# Patient Record
Sex: FEMALE | Race: WHITE | Employment: OTHER | URBAN - METROPOLITAN AREA
[De-identification: names, ages, dates, MRNs, and addresses within clinical notes are randomized per-mention and may not be internally consistent; named-entity substitution may affect disease eponyms.]

---

## 2017-11-06 ENCOUNTER — HOSPITAL ENCOUNTER (OUTPATIENT)
Dept: MAMMOGRAPHY | Age: 67
Discharge: HOME | End: 2017-11-06
Payer: MEDICARE

## 2017-11-06 ENCOUNTER — HOSPITAL ENCOUNTER (OUTPATIENT)
Dept: ULTRASOUND IMAGING | Age: 67
End: 2017-11-06
Payer: MEDICARE

## 2017-11-06 DIAGNOSIS — R92.8 ABNORMAL MAMMOGRAM: ICD-10-CM

## 2017-11-06 PROCEDURE — G0279 TOMOSYNTHESIS, MAMMO: HCPCS

## 2018-11-14 ENCOUNTER — HOSPITAL ENCOUNTER (OUTPATIENT)
Dept: MAMMOGRAPHY | Age: 68
Discharge: HOME | End: 2018-11-16
Payer: MEDICARE

## 2018-11-14 DIAGNOSIS — Z12.39 BREAST CANCER SCREENING: ICD-10-CM

## 2018-11-14 PROCEDURE — 77067 SCR MAMMO BI INCL CAD: CPT

## 2019-11-19 ENCOUNTER — HOSPITAL ENCOUNTER (OUTPATIENT)
Dept: MAMMOGRAPHY | Age: 69
Discharge: HOME | End: 2019-11-21
Payer: MEDICARE

## 2019-11-19 DIAGNOSIS — M85.80 OSTEOPENIA, UNSPECIFIED LOCATION: ICD-10-CM

## 2019-11-19 DIAGNOSIS — Z12.39 SCREENING BREAST EXAMINATION: ICD-10-CM

## 2019-11-19 PROCEDURE — 77080 DXA BONE DENSITY AXIAL: CPT

## 2019-11-19 PROCEDURE — 77063 BREAST TOMOSYNTHESIS BI: CPT

## 2021-04-12 ENCOUNTER — APPOINTMENT (OUTPATIENT)
Dept: GENERAL RADIOLOGY | Age: 71
End: 2021-04-12
Payer: MEDICARE

## 2021-04-12 ENCOUNTER — APPOINTMENT (OUTPATIENT)
Dept: CT IMAGING | Age: 71
End: 2021-04-12
Payer: MEDICARE

## 2021-04-12 ENCOUNTER — HOSPITAL ENCOUNTER (EMERGENCY)
Age: 71
Discharge: HOME | End: 2021-04-12
Attending: EMERGENCY MEDICINE
Payer: MEDICARE

## 2021-04-12 VITALS
OXYGEN SATURATION: 98 % | RESPIRATION RATE: 16 BRPM | SYSTOLIC BLOOD PRESSURE: 118 MMHG | WEIGHT: 198 LBS | BODY MASS INDEX: 35.08 KG/M2 | TEMPERATURE: 97.2 F | DIASTOLIC BLOOD PRESSURE: 61 MMHG | HEART RATE: 77 BPM | HEIGHT: 63 IN

## 2021-04-12 DIAGNOSIS — I44.0 FIRST DEGREE AV BLOCK: ICD-10-CM

## 2021-04-12 DIAGNOSIS — M54.2 NECK PAIN: Primary | ICD-10-CM
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
## 2021-04-12 LAB
ABSOLUTE EOS #: 0.09 K/UL (ref 0–0.44)
ABSOLUTE IMMATURE GRANULOCYTE: 0.02 K/UL (ref 0–0.3)
ABSOLUTE LYMPH #: 1.09 K/UL (ref 1.1–3.7)
ABSOLUTE MONO #: 0.43 K/UL (ref 0.1–1.2)
ANION GAP SERPL CALCULATED.3IONS-SCNC: 11 MMOL/L (ref 9–17)
BASOPHILS # BLD: 1 % (ref 0–2)
BASOPHILS ABSOLUTE: 0.03 K/UL (ref 0–0.2)
BUN BLDV-MCNC: 23 MG/DL (ref 8–23)
BUN/CREAT BLD: 13 (ref 9–20)
CALCIUM SERPL-MCNC: 9.8 MG/DL (ref 8.6–10.4)
CHLORIDE BLD-SCNC: 99 MMOL/L (ref 98–107)
CO2: 20 MMOL/L (ref 20–31)
CREAT SERPL-MCNC: 1.81 MG/DL (ref 0.5–0.9)
DIFFERENTIAL TYPE: ABNORMAL
EOSINOPHILS RELATIVE PERCENT: 2 % (ref 1–4)
GFR AFRICAN AMERICAN: 34 ML/MIN
GFR NON-AFRICAN AMERICAN: 28 ML/MIN
GFR SERPL CREATININE-BSD FRML MDRD: ABNORMAL ML/MIN/{1.73_M2}
GFR SERPL CREATININE-BSD FRML MDRD: ABNORMAL ML/MIN/{1.73_M2}
GLUCOSE BLD-MCNC: 192 MG/DL (ref 70–99)
HCT VFR BLD CALC: 37.2 % (ref 36.3–47.1)
HEMOGLOBIN: 12.3 G/DL (ref 11.9–15.1)
IMMATURE GRANULOCYTES: 0 %
LYMPHOCYTES # BLD: 18 % (ref 24–43)
MAGNESIUM: 1.9 MG/DL (ref 1.6–2.6)
MCH RBC QN AUTO: 30.6 PG (ref 25.2–33.5)
MCHC RBC AUTO-ENTMCNC: 33.1 G/DL (ref 28.4–34.8)
MCV RBC AUTO: 92.5 FL (ref 82.6–102.9)
MONOCYTES # BLD: 7 % (ref 3–12)
MYOGLOBIN: 237 NG/ML (ref 25–58)
NRBC AUTOMATED: 0 PER 100 WBC
PDW BLD-RTO: 12.6 % (ref 11.8–14.4)
PLATELET # BLD: 115 K/UL (ref 138–453)
PLATELET ESTIMATE: ABNORMAL
PMV BLD AUTO: 9.7 FL (ref 8.1–13.5)
POTASSIUM SERPL-SCNC: 4.8 MMOL/L (ref 3.7–5.3)
RBC # BLD: 4.02 M/UL (ref 3.95–5.11)
RBC # BLD: ABNORMAL 10*6/UL
SEG NEUTROPHILS: 72 % (ref 36–65)
SEGMENTED NEUTROPHILS ABSOLUTE COUNT: 4.42 K/UL (ref 1.5–8.1)
SODIUM BLD-SCNC: 130 MMOL/L (ref 135–144)
TROPONIN INTERP: ABNORMAL
TROPONIN T: ABNORMAL NG/ML
TROPONIN, HIGH SENSITIVITY: 20 NG/L (ref 0–14)
TSH SERPL DL<=0.05 MIU/L-ACNC: 1.62 MIU/L (ref 0.3–5)
WBC # BLD: 6.1 K/UL (ref 3.5–11.3)
WBC # BLD: ABNORMAL 10*3/UL

## 2021-04-12 PROCEDURE — 84443 ASSAY THYROID STIM HORMONE: CPT

## 2021-04-12 PROCEDURE — 85025 COMPLETE CBC W/AUTO DIFF WBC: CPT

## 2021-04-12 PROCEDURE — 81003 URINALYSIS AUTO W/O SCOPE: CPT

## 2021-04-12 PROCEDURE — 71045 X-RAY EXAM CHEST 1 VIEW: CPT

## 2021-04-12 PROCEDURE — 83874 ASSAY OF MYOGLOBIN: CPT

## 2021-04-12 PROCEDURE — 93005 ELECTROCARDIOGRAM TRACING: CPT | Performed by: EMERGENCY MEDICINE

## 2021-04-12 PROCEDURE — 70450 CT HEAD/BRAIN W/O DYE: CPT

## 2021-04-12 PROCEDURE — 83735 ASSAY OF MAGNESIUM: CPT

## 2021-04-12 PROCEDURE — 72125 CT NECK SPINE W/O DYE: CPT

## 2021-04-12 PROCEDURE — 84484 ASSAY OF TROPONIN QUANT: CPT

## 2021-04-12 PROCEDURE — 99284 EMERGENCY DEPT VISIT MOD MDM: CPT

## 2021-04-12 PROCEDURE — 80048 BASIC METABOLIC PNL TOTAL CA: CPT

## 2021-04-12 RX ORDER — 0.9 % SODIUM CHLORIDE 0.9 %
1000 INTRAVENOUS SOLUTION INTRAVENOUS ONCE
Status: DISCONTINUED | OUTPATIENT
Start: 2021-04-12 | End: 2021-04-12 | Stop reason: HOSPADM

## 2021-04-12 ASSESSMENT — ENCOUNTER SYMPTOMS
VOMITING: 0
TROUBLE SWALLOWING: 0
ABDOMINAL PAIN: 0
SHORTNESS OF BREATH: 0

## 2021-04-12 ASSESSMENT — PAIN DESCRIPTION - ORIENTATION: ORIENTATION: LEFT

## 2021-04-12 NOTE — ED PROVIDER NOTES
Medical Decision Making: The patient was initially seen by Dr. Jennifer Kwan and signed out to me after discussing the case with him thoroughly. CT scan findings are discussed with the patient and she is already had the thyroid addressed and had a negative biopsy. There is no acute findings in the head of the neck and she is able to be discharged home. Treatment diagnosis and follow-up were discussed thoroughly. CONSULTS:  None    PROCEDURES:  None    FINAL IMPRESSION      1. Neck pain    2. Fall, initial encounter    3.  First degree AV block         DISPOSITION/PLAN   DISPOSITION Decision To Discharge 04/12/2021 07:20:00 PM       PATIENT REFERRED TO:   Radha Linares MD  [address] [phone]      As needed    [facility] ED  [address] [facility]  [phone]    If symptoms worsen      DISCHARGE MEDICATIONS:     New Prescriptions    No medications on file         (Please note that portions of this note were completed with a voice recognition program.  Efforts were made to edit the dictations but occasionally words are mis-transcribed.)    Rupert Good MD  Attending Emergency Physician         Rupert Good MD  04/12/21 [id number]

## 2021-04-13 LAB
EKG ATRIAL RATE: 74 BPM
EKG P AXIS: 48 DEGREES
EKG P-R INTERVAL: 332 MS
EKG Q-T INTERVAL: 460 MS
EKG QRS DURATION: 146 MS
EKG QTC CALCULATION (BAZETT): 510 MS
EKG R AXIS: 65 DEGREES
EKG T AXIS: 24 DEGREES
EKG VENTRICULAR RATE: 74 BPM

## 2022-03-18 ENCOUNTER — HOSPITAL ENCOUNTER (OUTPATIENT)
Dept: ULTRASOUND IMAGING | Age: 72
Discharge: HOME | End: 2022-03-20
Payer: MEDICARE

## 2022-03-18 ENCOUNTER — HOSPITAL ENCOUNTER (OUTPATIENT)
Dept: MAMMOGRAPHY | Age: 72
Discharge: HOME | End: 2022-03-20
Payer: MEDICARE

## 2022-03-18 DIAGNOSIS — N64.4 BREAST PAIN, RIGHT: ICD-10-CM

## 2022-03-18 PROCEDURE — G0279 TOMOSYNTHESIS, MAMMO: HCPCS

## 2022-03-18 PROCEDURE — 76642 ULTRASOUND BREAST LIMITED: CPT

## 2022-03-29 ENCOUNTER — HOSPITAL ENCOUNTER (OUTPATIENT)
Dept: MAMMOGRAPHY | Age: 72
Discharge: HOME | End: 2022-03-31
Payer: MEDICARE

## 2022-03-29 VITALS — SYSTOLIC BLOOD PRESSURE: 105 MMHG | HEART RATE: 96 BPM | DIASTOLIC BLOOD PRESSURE: 62 MMHG

## 2022-03-29 DIAGNOSIS — Z98.890 STATUS POST BREAST BIOPSY: ICD-10-CM

## 2022-03-29 DIAGNOSIS — R92.1 BREAST CALCIFICATION, RIGHT: ICD-10-CM

## 2022-03-29 PROCEDURE — 88305 TISSUE EXAM BY PATHOLOGIST: CPT

## 2022-03-29 PROCEDURE — 77065 DX MAMMO INCL CAD UNI: CPT

## 2022-03-29 PROCEDURE — 2500000003 HC RX 250 WO HCPCS

## 2022-03-29 PROCEDURE — 19081 BX BREAST 1ST LESION STRTCTC: CPT

## 2022-03-31 LAB — SURGICAL PATHOLOGY REPORT: NORMAL

## 2022-04-06 ENCOUNTER — TELEPHONE (OUTPATIENT)
Dept: MAMMOGRAPHY | Age: 72
End: 2022-04-06

## 2022-04-06 NOTE — TELEPHONE ENCOUNTER
Spoke to Susan regarding her normal breast biopsy results. Radiology is recommending [facility], Katherine voiced understanding.

## 2022-05-12 ENCOUNTER — HOSPITAL ENCOUNTER (OUTPATIENT)
Dept: MRI IMAGING | Age: 72
Discharge: HOME | End: 2022-05-14
Payer: MEDICARE

## 2022-05-12 DIAGNOSIS — N64.4 BREAST PAIN, RIGHT: ICD-10-CM

## 2022-05-12 DIAGNOSIS — R92.1 BREAST CALCIFICATION SEEN ON MAMMOGRAM: ICD-10-CM

## 2022-05-12 DIAGNOSIS — D24.1 FIBROADENOMA OF RIGHT BREAST: ICD-10-CM

## 2022-05-12 DIAGNOSIS — Z80.3 FAMILY HISTORY OF BREAST CANCER: ICD-10-CM

## 2022-05-12 LAB
CREAT SERPL-MCNC: 1.55 MG/DL (ref 0.5–0.9)
GFR AFRICAN AMERICAN: 40 ML/MIN
GFR NON-AFRICAN AMERICAN: 33 ML/MIN
GFR SERPL CREATININE-BSD FRML MDRD: ABNORMAL ML/MIN/{1.73_M2}

## 2022-05-12 PROCEDURE — 36415 COLL VENOUS BLD VENIPUNCTURE: CPT

## 2022-05-12 PROCEDURE — 82565 ASSAY OF CREATININE: CPT

## 2022-05-12 RX ORDER — SODIUM CHLORIDE 0.9 % (FLUSH) 0.9 %
20 SYRINGE (ML) INJECTION PRN
Status: DISCONTINUED | OUTPATIENT
Start: 2022-05-12 | End: 2022-05-15 | Stop reason: HOSPADM

## 2022-09-15 ENCOUNTER — TRANSCRIBE ORDERS (OUTPATIENT)
Dept: ADMINISTRATIVE | Age: 72
End: 2022-09-15

## 2022-09-15 DIAGNOSIS — Z78.0 POST-MENOPAUSAL: ICD-10-CM

## 2022-09-15 DIAGNOSIS — M85.80 OSTEOPENIA, UNSPECIFIED LOCATION: Primary | ICD-10-CM

## 2022-12-16 ENCOUNTER — HOSPITAL ENCOUNTER (OUTPATIENT)
Age: 72
Discharge: HOME | End: 2022-12-16
Payer: MEDICARE

## 2022-12-16 LAB
ALT SERPL-CCNC: 16 U/L (ref 5–33)
CHOLESTEROL/HDL RATIO: 2.6
CHOLESTEROL: 154 MG/DL
HDLC SERPL-MCNC: 60 MG/DL
LDL CHOLESTEROL: 73 MG/DL (ref 0–130)
TRIGL SERPL-MCNC: 107 MG/DL

## 2022-12-16 PROCEDURE — 80061 LIPID PANEL: CPT

## 2022-12-16 PROCEDURE — 84460 ALANINE AMINO (ALT) (SGPT): CPT

## 2022-12-16 PROCEDURE — 36415 COLL VENOUS BLD VENIPUNCTURE: CPT

## 2023-01-13 ENCOUNTER — HOSPITAL ENCOUNTER (OUTPATIENT)
Dept: DIABETES SERVICES | Age: 73
Setting detail: THERAPIES SERIES
Discharge: HOME | End: 2023-01-13
Payer: MEDICARE

## 2023-01-13 PROCEDURE — G0108 DIAB MANAGE TRN  PER INDIV: HCPCS

## 2023-01-13 RX ORDER — ASPIRIN 325 MG
650 TABLET ORAL PRN
COMMUNITY
End: 2023-01-13

## 2023-01-13 RX ORDER — ATORVASTATIN CALCIUM 40 MG/1
40 TABLET, FILM COATED ORAL DAILY
COMMUNITY

## 2023-01-13 RX ORDER — FLECAINIDE ACETATE 150 MG/1
150 TABLET ORAL 2 TIMES DAILY
COMMUNITY

## 2023-01-13 RX ORDER — ASPIRIN 81 MG/1
81 TABLET, CHEWABLE ORAL DAILY
COMMUNITY

## 2023-01-13 RX ORDER — METOPROLOL SUCCINATE 25 MG/1
25 TABLET, EXTENDED RELEASE ORAL DAILY
COMMUNITY

## 2023-01-13 RX ORDER — SEMAGLUTIDE 1.34 MG/ML
2 INJECTION, SOLUTION SUBCUTANEOUS
COMMUNITY

## 2023-01-13 SDOH — ECONOMIC STABILITY: FOOD INSECURITY: ADDITIONAL INFORMATION: NO

## 2023-01-13 ASSESSMENT — PROBLEM AREAS IN DIABETES QUESTIONNAIRE (PAID)
PAID-5 TOTAL SCORE: 13
COPING WITH COMPLICATIONS OF DIABETES: 3
FEELING DEPRESSED WHEN YOU THINK ABOUT LIVING WITH DIABETES: 2
FEELING SCARED WHEN YOU THINK ABOUT LIVING WITH DIABETES: 2
FEELING THAT DIABETES IS TAKING UP TOO MUCH OF YOUR MENTAL AND PHYSICAL ENERGY EVERY DAY: 3
WORRYING ABOUT THE FUTURE AND THE POSSIBILITY OF SERIOUS COMPLICATIONS: 3

## 2023-01-13 NOTE — LETTER
[facility]  [address]  [facility] [address]  Phone: [phone]         January 13, 2023    To :Janelle Barthel, MD    From: Darlin Hickman RN    Patient: Troy Stone   YOB: 1950   Date of Visit: 1/13/23     An initial assessment to determine diabetes education needs was completed. Provided basic education on healthy eating patterns and how her diabetes medications work and prevention of low and high blood sugar    Education plan includes :interpretation of lab results, blood sugar goals, complications of diabetes mellitus, hypoglycemia prevention and treatment, exercise, illness management and nutrition. An ongoing plan was created to include: follow up - will call to set up - wanted to see cost of session before scheduling follow up    Thank you for the opportunity to provide Diabetes Self Management Education to your patient.

## 2023-01-13 NOTE — PROGRESS NOTES
Diabetes Self- Management Education Program Assessment -   Also see Diabetic Screening  Patient, Akila Lora,  here for diabetes self-management education  visit/ assessment. Today's visit was in an individual setting. MEDICAL HISTORY:  Past Medical History:   Diagnosis Date    Diabetes mellitus ([address]     Hypertension      No family history on file. Patient has no known allergies. Immunization History   Administered Date(s) Administered    COVID-19, MODERNA BLUE border, Primary or Immunocompromised, (age 12y+), IM, 100 mcg/0.5mL 02/12/2021, 03/12/2021     Current Medications  No current outpatient medications on file. No current facility-administered medications for this encounter.   :     Comments:  Allergies:  No Known Allergies      A1C blood level - at goal < 7%   Lab Results   Component Value Date    LABA1C 7.4 (H) 08/12/2016     Lab Results   Component Value Date    CREATININE 1.55 (H) 05/12/2022       Blood pressure ( 130/ 80)  Or less  BP Readings from Last 3 Encounters:   03/29/22 105/62   04/12/21 118/61        Cholesterol ( LDL under  100)   Lab Results   Component Value Date    LDLCHOLESTEROL 73 12/16/2022    LDLDIRECT 199 (H) 06/10/2016       Diabetes Self- Management Education Record    Participant Name: Akila Lora  Referring Provider: Madi Hernandez MD   Assessment/Evaluation Ratings:  1=Needs Instruction   4=Demonstrates Understanding/Competency  2=Needs Review   NC=Not Covered    3=Comprehends Key Points  N/A=Not Applicable  Topics/Learning Objectives Pre-session Assess Date:  1/13/23 rs   Instr. Date Reinforce Date Post- session Eval Comments   Diabetes disease process & Treatment process: Define diabetes & pre-diabetes; Identify own type of diabetes; role of the pancreas; signs/symptoms; diagnostic criteria; prevention & treatment options; contributing factors.  1    1/13/23 rs - stated DX about 5 years ago - but had gaps with health care - initial dx a1c 14% - now follows with pcp and dr Miriam Geronimo - since that time keept A1c 6- 7% but last  up to 8.4 and admit diet not good - seeking help now for diet planning and understanding meds and bg changes   Incorporating nutritional management into lifestyle: Describe effect of type, amount & timing of food on blood glucose; Describe basic meal planning techniques & current nutrition guideline   1  Retired now and sleeps in - late or skipped BK    Maybe Glucerna    Maybe meal at 2 pm and dinner is just snacking all night     Lives with her sister but they do not do meals together - out to eat few times per week     Drinks mostly diet mt dew    What to eat - Food groups, When to eat - timing of meals and snacks, and How much to eat - portions control. 1/13/23 rs - given glucerna coupons and example 1500 ADA diet        calories/ day   CHO choices/ meal   CHO choices/  day   grams of protein /day   gram of fat /day     Correctly read food labels & demonstrate CHO counting & portion control with personalized meal plan. Identify dining out strategies, & dietary sick day guidelines. 1       Incorporating physical activity into lifestyle:   Verbalize effect of exercise on blood glucose levels; benefits of regular exercise; safety considerations; contraindications; maintenance of activity. 1    Not active baseline  Thinking of starting use of home exercise machine - in her room    Using medications safely:  Identify effects of diabetes medicines on blood glucose levels; List diabetes medication taken, action & side effects;    1       Insulin / Injectable - Appropriate injection sites; proper storage; supplies needed; proper technique; safe needle disposal guidelines.    1    Omzempic - 2 mg ( she thinks) one weekly  and basaglar 50 units once per day - novolog log if high BG   Monitoring blood glucose, interpreting and using results:  Identify recommended & personal blood glucose targets; importance of testing; testing supplies; HgbA1C target levels; Factors affecting blood glucose; Importance of logging blood glucose levels for pattern recognition; ketone testing; safe lancet disposal.   1    Home BG meter - 60- 90 - and has had lows that need EMS call 2 x in last year - but also higher BG in pm in snacking - stated over 200 after pop tarts   Prevention, detection & treatment of acute complications:  Identify symptoms of hyper & hypoglycemia, and prevention & treatment strategies. 1       Describe sick day guidelines & indications for  physician notification. Identify short term consequences of poor control. Disaster preparedness strategies    1       Prevention, detection & treatment of chronic complications:  Define the natural course of diabetes & describe the relationship of blood glucose levels to long term complications of diabetes. Identify preventative measures & standards of care. 1       Developing strategies to address psychosocial issues:  Describe feelings about living with diabetes; Describe how stress, depression & anxiety affect blood glucose; Identify coping strategies; Identify support needed & support network available. 1    Paid = 13   Developing strategies to promote health/change behavior: Identify 7 self-care behaviors; Personal health risk factors; Benefits, challenges & strategies for behavioral change;    1         Individualized goal selection. My goal , to help me improve my health, I will:   1 Healthy eating       2. Be more active         Plan  Follow-up Appointments planned will call to set up - concern is cost of co pays    Instruction Method: [x]Lecture/Discussion  []Power Point Presentation  [x]Handouts  []Return Demonstration    Education Materials/Equipment Provided (VIA Mail for phone visits)  :    [x]Self-Management - Initial assessment - Enrolment in to ADA  Where do I Begin, Living with Type 2 diabetes ADA home support program and  handout on diabetes education classes.      []Understanding Diabetes session       Book How to Thrive: A guide for Your Journey with Diabetes ADA booklet -pages 4, 14-19, 22-23        View: Understanding Type 2 Diabetes from Animated Diabetes Patient [email or web address]    [] Healthy Eating and Meal planning  How to Thrive: A guide for Your journey with Diabetes - ADA booklet - pages 20- 21 & 42-43  Handouts: Smarter snacking, Lowdown on low - carb diets, Supermarket savvy, Ready, set, start counting, Reading a nutrition fact label, 7 ways to size up your servings    []   Medications and Prevention of Complications      Book How to Thrive: A guide for Your Journey with Diabetes - ADA booklet -  pages 6-7, 12-13, 24-27 & 28 -35  Handouts: Know your numbers, Vaccinations, Type 2 diabetes and the role of GLP- 1, How to care for your teeth and gums, Caring for your feet, How to pick the right shoes  Individualized Diabetes report card     []  Diet Follow Up- CHO counting and  planning for sick days, holiday, vacations   How to Thrive: A guide for Your journey with Diabetes - ADA booklet  - pages 43- 37  Diabetes Food hub www. diabetesfoodhub.org   Handouts: Sick day rules, Dining out guide, Diabetes and alcohol, Traveling with diabetes, Diabetes disaster preparedness plan, Holidays and special occasions                                                            []  Self care and goal review -  3 month follow -    Handouts: AADE7 Self care behaviors work sheets and personal goal setting worksheet review, DSME support options on line     []Self-Management  Gestational - RN class -Resource materials sent out : care booklet - \" Gestational Diabetes Mellitus ( GDM) toolkit form [place] gestational diabetes postpartum care learning collaborative 2019. \"Simple Guidelines for meal planning with gestational diabetes. SMBG sheets to fax back to MFM weekly. BD  healthy injection site selection and rotation with 6 mm insulin syringe and 4 mm pen needle.  Gestational diabetes handout from [employer] 2016. Did you have gestational diabetes when you were pregnant? Handout from [facility]  April 2014    []Self-Management Gestational - RD class - My Food Plan for Gestational diabetes    []Glucose Meter     []Insulin Kit     []Other      Encounter Type Date Start Time End Time Comments No Show Dates   Assessment 1/13/23 rs    11 30   12 30  x    Class 1 - Understanding diabetes         Class 2- Nutrition and diabetes          Class 3 - Preventing Complications        Class 4 -  In depth Nutrition and sick day care        Class 5 - 3 month follow up / goal reassessment        Gestational - RN         Gestational - RD        Individual MNT         Shared Med Appt         Yearly Follow-up        Meter Instrx      How to Measure Your Blood Sugar - [facility] Patient Education  [email or web address]    Insulin Instrx      []Pen  []Vial & Syringe   BD Diabetes Care: How to Inject Insulin with a Pen Needle  [email or web address]    Diabetes Care: How to Inject Insulin with a Syringe  [email or web address]       DSMS Support :   [] MNT      [] Annual update     [] Starting Fresh  adults living with diabetes or pre diabetes. [address] [place] [address] [phone] call for dates    []  Diabetes Group at  [place] [address] of jeronimo - Free 6 week diabetes education support   classes - use web site interest form found at  [email or web address].pt - to enroll       []ADA  Where do I Begin, Living with Type 2 diabetes ADA home support program  Web site: diabetes. org/living    Call: 1800 DIABETES  e-mail: Mandie@[email or web address] org     [x]  Internet web sites - ADAWeb site: diabetes. org and diabetesfoodhub.org      Post Education Referrals:      [] [place] Tobacco Quit information sheet and [address]       [] Dental care - [facility] care of [facility]     [] [facility]) [facility] [phone] phone number - for information and referral to [facility]  Clinically  [address], FOOT, CARDIAC, WOUND, WEIGHT MANAGEMENT        []Other  Rm Montgomery RN

## 2024-05-08 ENCOUNTER — HOSPITAL ENCOUNTER (OUTPATIENT)
Dept: MAMMOGRAPHY | Age: 74
Discharge: HOME | End: 2024-05-10
Payer: MEDICARE

## 2024-05-08 VITALS — HEIGHT: 63 IN | WEIGHT: 198 LBS | BODY MASS INDEX: 35.08 KG/M2

## 2024-05-08 DIAGNOSIS — Z12.31 ENCOUNTER FOR SCREENING MAMMOGRAM FOR MALIGNANT NEOPLASM OF BREAST: ICD-10-CM

## 2024-05-08 PROCEDURE — 77067 SCR MAMMO BI INCL CAD: CPT

## 2025-07-09 ENCOUNTER — HOSPITAL ENCOUNTER (OUTPATIENT)
Age: 75
Setting detail: SPECIMEN
Discharge: HOME | End: 2025-07-09

## 2025-07-09 LAB
25(OH)D3 SERPL-MCNC: 40.7 NG/ML (ref 30–100)
ALBUMIN SERPL-MCNC: 3.7 G/DL (ref 3.5–5.2)
ALBUMIN/GLOB SERPL: 1.3 {RATIO} (ref 1–2.5)
ALP SERPL-CCNC: 136 U/L (ref 35–104)
ALT SERPL-CCNC: 19 U/L (ref 10–35)
ANION GAP SERPL CALCULATED.3IONS-SCNC: 10 MMOL/L (ref 9–16)
AST SERPL-CCNC: 17 U/L (ref 10–35)
BASOPHILS # BLD: <0.03 K/UL (ref 0–0.2)
BASOPHILS NFR BLD: 0 % (ref 0–2)
BILIRUB DIRECT SERPL-MCNC: 0.3 MG/DL (ref 0–0.2)
BILIRUB INDIRECT SERPL-MCNC: 0.4 MG/DL (ref 0–1)
BILIRUB SERPL-MCNC: 0.7 MG/DL (ref 0–1.2)
BUN SERPL-MCNC: 29 MG/DL (ref 8–23)
CA-I BLD-SCNC: 1.24 MMOL/L (ref 1.13–1.33)
CALCIUM SERPL-MCNC: 9.6 MG/DL (ref 8.6–10.4)
CHLORIDE SERPL-SCNC: 103 MMOL/L (ref 98–107)
CO2 SERPL-SCNC: 26 MMOL/L (ref 20–31)
CREAT SERPL-MCNC: 1.7 MG/DL (ref 0.6–0.9)
CREAT UR-MCNC: 279 MG/DL (ref 28–217)
EOSINOPHIL # BLD: 0.22 K/UL (ref 0–0.44)
EOSINOPHILS RELATIVE PERCENT: 4 % (ref 1–4)
ERYTHROCYTE [DISTWIDTH] IN BLOOD BY AUTOMATED COUNT: 13.1 % (ref 11.8–14.4)
GFR, ESTIMATED: 31 ML/MIN/1.73M2
GLUCOSE SERPL-MCNC: 210 MG/DL (ref 74–99)
HCT VFR BLD AUTO: 35.1 % (ref 36.3–47.1)
HGB BLD-MCNC: 12 G/DL (ref 11.9–15.1)
IMM GRANULOCYTES # BLD AUTO: <0.03 K/UL (ref 0–0.3)
IMM GRANULOCYTES NFR BLD: 0 %
LYMPHOCYTES NFR BLD: 1.27 K/UL (ref 1.1–3.7)
LYMPHOCYTES RELATIVE PERCENT: 23 % (ref 24–43)
MCH RBC QN AUTO: 31.3 PG (ref 25.2–33.5)
MCHC RBC AUTO-ENTMCNC: 34.2 G/DL (ref 28.4–34.8)
MCV RBC AUTO: 91.6 FL (ref 82.6–102.9)
MICROALBUMIN UR-MCNC: 140 MG/L (ref 0–20)
MICROALBUMIN/CREAT UR-RTO: 50 MCG/MG CREAT (ref 0–25)
MONOCYTES NFR BLD: 0.32 K/UL (ref 0.1–1.2)
MONOCYTES NFR BLD: 6 % (ref 3–12)
NEUTROPHILS NFR BLD: 67 % (ref 36–65)
NEUTS SEG NFR BLD: 3.73 K/UL (ref 1.5–8.1)
NRBC BLD-RTO: 0 PER 100 WBC
PLATELET # BLD AUTO: ABNORMAL K/UL (ref 138–453)
PLATELET, FLUORESCENCE: 112 K/UL (ref 138–453)
PLATELETS.RETICULATED NFR BLD AUTO: 3 % (ref 1.1–10.3)
POTASSIUM SERPL-SCNC: 4 MMOL/L (ref 3.7–5.3)
PROT SERPL-MCNC: 6.6 G/DL (ref 6.6–8.7)
PTH-INTACT SERPL-MCNC: 80 PG/ML (ref 17.9–58.6)
RBC # BLD AUTO: 3.83 M/UL (ref 3.95–5.11)
SODIUM SERPL-SCNC: 139 MMOL/L (ref 136–145)
TSH SERPL DL<=0.05 MIU/L-ACNC: 1.84 UIU/ML (ref 0.27–4.2)
WBC OTHER # BLD: 5.6 K/UL (ref 3.5–11.3)